# Patient Record
(demographics unavailable — no encounter records)

---

## 2024-11-08 NOTE — HISTORY OF PRESENT ILLNESS
[de-identified] : 62 year F with PMH HTN, pHTN, and brain aneurysm presents for CPE. Pt is due for multiple specialists and screenings. Pt denies CP/SOB, fever/chills, n/v/d/c.

## 2024-11-08 NOTE — HEALTH RISK ASSESSMENT
[Good] : ~his/her~  mood as  good [Yes] : Yes [Monthly or less (1 pt)] : Monthly or less (1 point) [1 or 2 (0 pts)] : 1 or 2 (0 points) [Never (0 pts)] : Never (0 points) [No] : In the past 12 months have you used drugs other than those required for medical reasons? No [0] : 2) Feeling down, depressed, or hopeless: Not at all (0) [PHQ-2 Negative - No further assessment needed] : PHQ-2 Negative - No further assessment needed [Former] : Former [20 or more] : 20 or more [> 15 Years] : > 15 Years [NO] : No [Patient reported mammogram was normal] : Patient reported mammogram was normal [None] : None [With Family] : lives with family [Employed] : employed [] :  [Audit-CScore] : 1 [[id number]] : 0

## 2024-11-08 NOTE — PLAN
[FreeTextEntry1] : Routine blood work drawn in office and urine collected today. Rx sent. Mammogram ordered. Dermatology referral placed. Pt advised to sign up for [facility] portal to review labs and communicate any questions or concerns directly. Yearly physical and return as needed for illness, medication refills, and new or existing complaints.

## 2024-11-29 NOTE — PHYSICAL EXAM
[Alert] : alert [Oriented x 3] : ~L oriented x 3 [Well Nourished] : well nourished [Conjunctiva Non-injected] : conjunctiva non-injected [No Visual Lymphadenopathy] : no visual  lymphadenopathy [No Clubbing] : no clubbing [No Edema] : no edema [No Bromhidrosis] : no bromhidrosis [No Chromhidrosis] : no chromhidrosis [Declined] : declined [FreeTextEntry3] : Focused exam only (see below) per patient request: - Multiple keloidal and hypertrophic plaques on bilateral shoulders - One very large exophytic plaque on R posterior shoulder

## 2024-11-29 NOTE — ASSESSMENT
[FreeTextEntry1] : #Keloids and #Hypertropic scars, bilateral shoulders - Intralesional injection of Kenalog 40 mg/ml was performed into multiple keloidal plaques - a total of 1.3 ml was injected - The risks/benefits/alternatives of intralesional steroid injections were reviewed with the patient which include but are not limited to pain, atrophy, and dyspigmentation. Intralesional injections were performed only in the affected area. The patient tolerated the procedure well. - Lot #: [id number], Expiration date: 02/26 - Referrals provided for Plastic Surgery (for excision) and Rad Onc for radiation tx of keloids - number provided for [facility] clinical coordination line  #Seborrheic keratoses, chest - Counseled patient on benign clinical findings - Reassurance provided  RTC 4-6 weeks for ILK

## 2024-11-29 NOTE — HISTORY OF PRESENT ILLNESS
[FreeTextEntry1] : NPV discoloration [de-identified] : Ms. JOCELINE CARRILLO is a 62-year-old female who presents for:    1) Keloids - Duration: years - started after shoulder surgery in 2018 - grew bigger progressively  - Symptoms: itch - Prior tx: none   2) Spots, chest - Duration: years   - Symptoms: none - Prior tx: none  Derm Hx: none; no personal hx of skin cancer Family Hx: no family hx of skin cancer Social Hx: caregiver,

## 2024-12-30 NOTE — HISTORY OF PRESENT ILLNESS
[Home] : at home, [unfilled] , at the time of the visit. [Medical Office: ([facility])___] : at the medical office located in  [Verbal consent obtained from patient] : the patient, [unfilled] [FreeTextEntry8] : 62 F presents with c/o nasal congestion and cough. She states she went to urgent care one month ago and took antibiotics. Went back 2 weeks ago and got Tessalon Perles for mucus and zyrtec. Still having congestion and postnasl drip and headaches. She states eye also feel watery and itchy. Needs refill zyrtec and tessalon perles. Currently not using nasal spray

## 2025-01-04 NOTE — HISTORY OF PRESENT ILLNESS
[FreeTextEntry1] : NPV discoloration [de-identified] : Ms. JOCELINE CARRILLO is a 62-year-old female who presents for f/u of keloids. LV 1 month prior when received ILK. Believes areas have decreased in size but remain tender.    1) Keloids - Duration: years - started after shoulder surgery in 2018 - grew bigger progressively  - Symptoms: itch - Prior tx: none   2) Spots, chest - Duration: years   - Symptoms: none - Prior tx: none  Derm Hx: none; no personal hx of skin cancer Family Hx: no family hx of skin cancer Social Hx: caregiver,

## 2025-01-04 NOTE — ASSESSMENT
[FreeTextEntry1] : #Keloids and #Hypertropic scars, bilateral shoulders - Intralesional injection of Kenalog 40 mg/ml was performed into multiple keloidal plaques - a total of 1.5 ml was injected --treatment #2 today - The risks/benefits/alternatives of intralesional steroid injections were reviewed with the patient which include but are not limited to pain, atrophy, and dyspigmentation. Intralesional injections were performed only in the affected area. The patient tolerated the procedure well.  - Referrals provided for Plastic Surgery (for excision) and Rad Onc for radiation tx of keloids - number provided for [facility] clinical coordination line  #Seborrheic keratoses, chest - Counseled patient on benign clinical findings - Reassurance provided  RTC 4-6 weeks for ILK

## 2025-01-04 NOTE — ASSESSMENT
[FreeTextEntry1] : #Keloids and #Hypertropic scars, bilateral shoulders - Intralesional injection of Kenalog 40 mg/ml was performed into multiple keloidal plaques - a total of 1.5 ml was injected --treatment #2 today - The risks/benefits/alternatives of intralesional steroid injections were reviewed with the patient which include but are not limited to pain, atrophy, and dyspigmentation. Intralesional injections were performed only in the affected area. The patient tolerated the procedure well.  - Referrals provided for Plastic Surgery (for excision) and Rad Onc for radiation tx of keloids - number provided for [facility] clinical coordination line  #Seborrheic keratoses, chest - Counseled patient on benign clinical findings - Reassurance provided  RTC 4-6 weeks for ILK

## 2025-01-04 NOTE — HISTORY OF PRESENT ILLNESS
[FreeTextEntry1] : NPV discoloration [de-identified] : Ms. JOCELINE CARRILLO is a 62-year-old female who presents for f/u of keloids. LV 1 month prior when received ILK. Believes areas have decreased in size but remain tender.    1) Keloids - Duration: years - started after shoulder surgery in 2018 - grew bigger progressively  - Symptoms: itch - Prior tx: none   2) Spots, chest - Duration: years   - Symptoms: none - Prior tx: none  Derm Hx: none; no personal hx of skin cancer Family Hx: no family hx of skin cancer Social Hx: caregiver,